# Patient Record
Sex: FEMALE | Race: OTHER | HISPANIC OR LATINO | URBAN - METROPOLITAN AREA
[De-identification: names, ages, dates, MRNs, and addresses within clinical notes are randomized per-mention and may not be internally consistent; named-entity substitution may affect disease eponyms.]

---

## 2022-01-28 ENCOUNTER — OUTPATIENT (OUTPATIENT)
Dept: OUTPATIENT SERVICES | Facility: HOSPITAL | Age: 32
LOS: 1 days | End: 2022-01-28
Payer: COMMERCIAL

## 2022-01-28 VITALS
TEMPERATURE: 98 F | RESPIRATION RATE: 18 BRPM | SYSTOLIC BLOOD PRESSURE: 101 MMHG | HEIGHT: 62 IN | HEART RATE: 63 BPM | OXYGEN SATURATION: 98 % | WEIGHT: 179.9 LBS | DIASTOLIC BLOOD PRESSURE: 70 MMHG

## 2022-01-28 DIAGNOSIS — O08.9 UNSPECIFIED COMPLICATION FOLLOWING AN ECTOPIC AND MOLAR PREGNANCY: ICD-10-CM

## 2022-01-28 LAB
ANION GAP SERPL CALC-SCNC: 11 MMOL/L — SIGNIFICANT CHANGE UP (ref 5–17)
BLD GP AB SCN SERPL QL: NEGATIVE — SIGNIFICANT CHANGE UP
BUN SERPL-MCNC: 15 MG/DL — SIGNIFICANT CHANGE UP (ref 7–23)
CALCIUM SERPL-MCNC: 9.8 MG/DL — SIGNIFICANT CHANGE UP (ref 8.4–10.5)
CHLORIDE SERPL-SCNC: 101 MMOL/L — SIGNIFICANT CHANGE UP (ref 96–108)
CO2 SERPL-SCNC: 24 MMOL/L — SIGNIFICANT CHANGE UP (ref 22–31)
CREAT SERPL-MCNC: 0.72 MG/DL — SIGNIFICANT CHANGE UP (ref 0.5–1.3)
GLUCOSE SERPL-MCNC: 96 MG/DL — SIGNIFICANT CHANGE UP (ref 70–99)
HCG SERPL-ACNC: 3.3 MIU/ML — SIGNIFICANT CHANGE UP
HCT VFR BLD CALC: 39.3 % — SIGNIFICANT CHANGE UP (ref 34.5–45)
HGB BLD-MCNC: 12.6 G/DL — SIGNIFICANT CHANGE UP (ref 11.5–15.5)
MCHC RBC-ENTMCNC: 27.8 PG — SIGNIFICANT CHANGE UP (ref 27–34)
MCHC RBC-ENTMCNC: 32.1 GM/DL — SIGNIFICANT CHANGE UP (ref 32–36)
MCV RBC AUTO: 86.6 FL — SIGNIFICANT CHANGE UP (ref 80–100)
NRBC # BLD: 0 /100 WBCS — SIGNIFICANT CHANGE UP (ref 0–0)
PLATELET # BLD AUTO: 316 K/UL — SIGNIFICANT CHANGE UP (ref 150–400)
POTASSIUM SERPL-MCNC: 4.4 MMOL/L — SIGNIFICANT CHANGE UP (ref 3.5–5.3)
POTASSIUM SERPL-SCNC: 4.4 MMOL/L — SIGNIFICANT CHANGE UP (ref 3.5–5.3)
RBC # BLD: 4.54 M/UL — SIGNIFICANT CHANGE UP (ref 3.8–5.2)
RBC # FLD: 12.7 % — SIGNIFICANT CHANGE UP (ref 10.3–14.5)
RH IG SCN BLD-IMP: POSITIVE — SIGNIFICANT CHANGE UP
SODIUM SERPL-SCNC: 136 MMOL/L — SIGNIFICANT CHANGE UP (ref 135–145)
WBC # BLD: 6.54 K/UL — SIGNIFICANT CHANGE UP (ref 3.8–10.5)
WBC # FLD AUTO: 6.54 K/UL — SIGNIFICANT CHANGE UP (ref 3.8–10.5)

## 2022-01-28 PROCEDURE — 86901 BLOOD TYPING SEROLOGIC RH(D): CPT

## 2022-01-28 PROCEDURE — U0003: CPT

## 2022-01-28 PROCEDURE — U0005: CPT

## 2022-01-28 PROCEDURE — 80048 BASIC METABOLIC PNL TOTAL CA: CPT

## 2022-01-28 PROCEDURE — 36415 COLL VENOUS BLD VENIPUNCTURE: CPT

## 2022-01-28 PROCEDURE — 84702 CHORIONIC GONADOTROPIN TEST: CPT

## 2022-01-28 PROCEDURE — 86850 RBC ANTIBODY SCREEN: CPT

## 2022-01-28 PROCEDURE — G0463: CPT

## 2022-01-28 PROCEDURE — 85027 COMPLETE CBC AUTOMATED: CPT

## 2022-01-28 PROCEDURE — 86900 BLOOD TYPING SEROLOGIC ABO: CPT

## 2022-01-28 RX ORDER — SODIUM CHLORIDE 9 MG/ML
3 INJECTION INTRAMUSCULAR; INTRAVENOUS; SUBCUTANEOUS EVERY 8 HOURS
Refills: 0 | Status: DISCONTINUED | OUTPATIENT
Start: 2022-02-01 | End: 2022-02-15

## 2022-01-28 NOTE — H&P PST ADULT - HISTORY OF PRESENT ILLNESS
This is a 32 y/o female was 7 weeks gestation in 2021, had elective  by taking "pills" in 2021 , still c/o vaginal bleeding , she presents today for dilation curettage for molar pregnancy 22  covid swab was done today at PST , denies recent travel, sick contact, covid symptoms

## 2022-01-28 NOTE — H&P PST ADULT - NSANTHOSAYNRD_GEN_A_CORE
No. KRISTA screening performed.  STOP BANG Legend: 0-2 = LOW Risk; 3-4 = INTERMEDIATE Risk; 5-8 = HIGH Risk

## 2022-01-28 NOTE — H&P PST ADULT - FALL HARM RISK - ATTEMPT OOB
No Pre-Excision Curettage Text (Leave Blank If You Do Not Want): Prior to drawing the surgical margin the visible lesion was removed with electrodesiccation and curettage to clearly define the lesion size.

## 2022-01-29 LAB — SARS-COV-2 RNA SPEC QL NAA+PROBE: SIGNIFICANT CHANGE UP

## 2022-02-01 ENCOUNTER — OUTPATIENT (OUTPATIENT)
Dept: OUTPATIENT SERVICES | Facility: HOSPITAL | Age: 32
LOS: 1 days | End: 2022-02-01
Payer: COMMERCIAL

## 2022-02-01 VITALS
SYSTOLIC BLOOD PRESSURE: 100 MMHG | WEIGHT: 179.9 LBS | OXYGEN SATURATION: 98 % | HEART RATE: 68 BPM | HEIGHT: 62 IN | TEMPERATURE: 98 F | RESPIRATION RATE: 16 BRPM | DIASTOLIC BLOOD PRESSURE: 65 MMHG

## 2022-02-01 VITALS
OXYGEN SATURATION: 100 % | RESPIRATION RATE: 16 BRPM | DIASTOLIC BLOOD PRESSURE: 60 MMHG | SYSTOLIC BLOOD PRESSURE: 99 MMHG | TEMPERATURE: 97 F | HEART RATE: 61 BPM

## 2022-02-01 DIAGNOSIS — O08.9 UNSPECIFIED COMPLICATION FOLLOWING AN ECTOPIC AND MOLAR PREGNANCY: ICD-10-CM

## 2022-02-01 PROCEDURE — 86900 BLOOD TYPING SEROLOGIC ABO: CPT

## 2022-02-01 PROCEDURE — 59812 TREATMENT OF MISCARRIAGE: CPT

## 2022-02-01 PROCEDURE — 88305 TISSUE EXAM BY PATHOLOGIST: CPT

## 2022-02-01 PROCEDURE — 86901 BLOOD TYPING SEROLOGIC RH(D): CPT

## 2022-02-01 PROCEDURE — 86850 RBC ANTIBODY SCREEN: CPT

## 2022-02-01 PROCEDURE — 88305 TISSUE EXAM BY PATHOLOGIST: CPT | Mod: 26

## 2022-02-01 RX ORDER — ONDANSETRON 8 MG/1
4 TABLET, FILM COATED ORAL ONCE
Refills: 0 | Status: COMPLETED | OUTPATIENT
Start: 2022-02-01 | End: 2022-02-01

## 2022-02-01 RX ORDER — LIDOCAINE HCL 20 MG/ML
0.2 VIAL (ML) INJECTION ONCE
Refills: 0 | Status: COMPLETED | OUTPATIENT
Start: 2022-02-01 | End: 2022-02-01

## 2022-02-01 RX ORDER — OXYCODONE HYDROCHLORIDE 5 MG/1
5 TABLET ORAL ONCE
Refills: 0 | Status: DISCONTINUED | OUTPATIENT
Start: 2022-02-01 | End: 2022-02-01

## 2022-02-01 RX ADMIN — ONDANSETRON 4 MILLIGRAM(S): 8 TABLET, FILM COATED ORAL at 14:17

## 2022-02-01 RX ADMIN — SODIUM CHLORIDE 3 MILLILITER(S): 9 INJECTION INTRAMUSCULAR; INTRAVENOUS; SUBCUTANEOUS at 10:29

## 2022-02-01 RX ADMIN — OXYCODONE HYDROCHLORIDE 5 MILLIGRAM(S): 5 TABLET ORAL at 14:16

## 2022-02-01 NOTE — ASU DISCHARGE PLAN (ADULT/PEDIATRIC) - CARE PROVIDER_API CALL
Mayra Fung)  Obstetrics and Gynecology  [address] [place] [id number]  Phone: [phone]  Fax: [phone]  Follow Up Time: 1 week

## 2022-02-01 NOTE — BRIEF OPERATIVE NOTE - NSICDXBRIEFPROCEDURE_GEN_ALL_CORE_FT
PROCEDURES:  Dilation and curettage, uterus, using suction, with US guidance 01-Feb-2022 13:41:51  Mayra Fung

## 2022-02-01 NOTE — ASU PATIENT PROFILE, ADULT - FALL HARM RISK - HARM RISK INTERVENTIONS

## 2022-02-01 NOTE — PRE-ANESTHESIA EVALUATION ADULT - NSANTHGENDERRD_ENT_A_CORE
[facility] Emergency Department  [address] [place] [address]  Phone:  [phone]  Fax:  [phone]                                    Bradley Aasen   MRN: [id number]    Department:  [facility] Emergency Department   Date of Visit:  12/6/2019           After Visit Summary Signature Page    I have received my discharge instructions, and my questions have been answered. I have discussed any challenges I see with this plan with the nurse or doctor.    ..........................................................................................................................................  Patient/Patient Representative Signature      ..........................................................................................................................................  Patient Representative Print Name and Relationship to Patient    ..................................................               ................................................  Date                                   Time    ..........................................................................................................................................  Reviewed by Signature/Title    ...................................................              ..............................................  Date                                               Time          22EPIC Rev 08/18        no apparent No

## 2022-02-01 NOTE — ASU DISCHARGE PLAN (ADULT/PEDIATRIC) - NS MD DC FALL RISK RISK
For information on Fall & Injury Prevention, visit: [email or web address] OR  [email or web address] OR  https://www.cdc.gov/steadi/patient.html

## 2022-02-01 NOTE — PRE-ANESTHESIA EVALUATION ADULT - NSANTHPMHFT_GEN_ALL_CORE
This is a 32 y/o female was 7 weeks gestation in 2021, had elective  by taking "pills" in 2021 , still c/o vaginal bleeding , she presents today for dilation curettage for molar pregnancy 22

## 2022-02-02 LAB — CHROM ANALY OVERALL INTERP SPEC-IMP: SIGNIFICANT CHANGE UP

## 2022-02-09 LAB — SURGICAL PATHOLOGY STUDY: SIGNIFICANT CHANGE UP

## 2022-04-30 NOTE — H&P PST ADULT - HEMATOLOGY/LYMPHATICS
Patient:   David Wallace             MRN: [id number]            FIN: [id number]               Age:   57 years     Sex:  Female     :  1961   Associated Diagnoses:   Chest pain   Author:   Garfield Mueller      Basic Information   Time seen: Date & time 2019 12:18:00.   History source: Patient.   Arrival mode: Private vehicle, walking.   History limitation: None.   Additional information: Patient's physician(s): Dr Copeland in [place], Chief Complaint from Nursing Triage Note : Chief Complaint   2019 12:18 CST      Chief Complaint           c/o intermittent  L sided CP that radiates down L arm and back x1 week. also reports L sided jaw pain and SOB x 1 week. denies cardiac hx. denies pain in triage.    .      History of Present Illness   The patient presents with chest pain and Patient states chest pain that radiates down her left sarah and intermittent SOB x one week (ambar, SUYAPA). .  The onset was 1  weeks ago.  The course/duration of symptoms is fluctuating in intensity.  Location: Left anterior superior chest. Radiating pain: left arm. The character of symptoms is tightness.  The degree at onset was moderate.  The degree at maximum was moderate.  The degree at present is none.  The exacerbating factor is none.  The relieving factor is none.  Risk factors consist of family history of coronary artery disease.  Prior episodes: none.  Therapy today None.  Associated symptoms: none.  Patient presents with chest pain radiating to left arm x 1 week. The pain is not exacerbated by activity. She does smoke a half a pack per day for greater than 40 years. Her father did have cardiovascular disease. She has had a hysterectomy which she is not on hormone replacement..        Review of Systems   Constitutional symptoms:  No fever, no chills.    Respiratory symptoms:  No shortness of breath, no cough.    Cardiovascular symptoms:  Chest pain, No palpitations,    Gastrointestinal symptoms:  No  vomiting, no diarrhea.    Musculoskeletal symptoms:  No back pain,    Neurologic symptoms:  No altered level of consciousness, no weakness.              Additional review of systems information: All other systems reviewed and otherwise negative.      Health Status   Allergies:    Allergic Reactions (Selected)  Severity Not Documented  Penicillins- No reactions were documented.,    Allergies (1) Active Reaction  penicillins None Documented.   Medications:  (Selected)   Documented Medications  Documented  KlonoPIN 0.5 mg oral tablet: 0.5 mg = 1 tab(s), Oral, Daily, 0 Refill(s)  Paxil 20 mg oral tablet: 10 mg = 0.5 tab(s), Oral, Daily, # 30 tab(s), 0 Refill(s), per nurse's notes.   Immunizations: Per nurse's notes.   Menstrual history: Per nurse's notes.      Past Medical/ Family/ Social History   Medical history:    No active or resolved past medical history items have been selected or recorded., Reviewed as documented in chart.   Surgical history:    Total abdominal hysterectomy (corpus and cervix), with or without removal of tube(s), with or without removal of ovary(s); (35171).  Appendectomy; (35893)., Reviewed as documented in chart.   Family history:    No family history items have been selected or recorded., Reviewed as documented in chart.   Social history: Reviewed as documented in chart.   Problem list: Per nurse's notes.      Physical Examination   General:  Alert, no acute distress, well hydrated, Skin: Normal for ethnicity.    Skin:  Warm, dry, pink, intact.    Head:  Normocephalic.   Neck:  Supple, no tenderness, full range of motion.    Eye:  Pupils are equal, round and reactive to light, extraocular movements are intact, normal conjunctiva.    Ears, nose, mouth and throat:  Oral mucosa moist.   Cardiovascular:  Regular rate and rhythm, No murmur, Normal peripheral perfusion.    Respiratory:  Lungs are clear to auscultation, breath sounds are equal, Respirations: not tachypneic, not labored, not shallow,  Retractions: None.    Chest wall:  No tenderness.   Back:  Normal range of motion, Normal alignment, No costovertebral angle tenderness,    Musculoskeletal:  Normal ROM, normal strength, no swelling, no deformity.    Gastrointestinal:  Soft, Nontender, Non distended, Normal bowel sounds.    Neurological:  Alert and oriented to person, place, time, and situation, No focal neurological deficit observed, normal sensory observed, normal motor observed, normal speech observed, normal coordination observed, Gait: Normal.    Psychiatric:  Cooperative, appropriate mood & affect, normal judgment.       Medical Decision Making   Documents reviewed:  Emergency department nurses' notes.   Orders  Launch Orders   Pharmacy:  aspirin 325 mg oral tablet (Order): 325 mg, form: Tab, Oral, Once, first dose 1/31/2019 15:08 CST, stop date 1/31/2019 15:08 CST, STAT, 4 chew tab = 5 grains  , Launch Orders   Laboratory:  POC iSTAT ER Troponin request (Order): Blood, Stat collect, 1/31/2019 15:52 CST by Demetria PONCE, Garfield Frausto, Lab Collect, Print Label By Order Location  Cardiology:  EKG (Order): 1/31/2019 15:52 CST, NOW, -1, -1, 1/31/2019 15:52 CST  .    Electrocardiogram:  Time 1/31/2019 12:18:00, rate 62, normal sinus rhythm, No ST-T changes, no ectopy, normal IL & QRS intervals, EP Interp.    Electrocardiogram:  Time 1/31/2019 15:55:00, rate 51, normal sinus rhythm, No ST-T changes, no ectopy, normal IL & QRS intervals, EP Interp.    Results review:  Lab results : Lab View   1/31/2019 16:09 CST      POC Troponin              0.00 ng/mL    1/31/2019 14:10 CST      POC BNP iSTAT             <15 pg/mL    1/31/2019 13:44 CST      POC BNP iSTAT             <15 pg/mL                             POC Troponin              0.00 ng/mL    1/31/2019 12:47 CST      Sodium Lvl                140 mmol/L                             Potassium Lvl             4.8 mmol/L                             Chloride                  104 mmol/L                              CO2                       31.0 mmol/L                             Calcium Lvl               9.6 mg/dL                             Glucose Lvl               93 mg/dL                             BUN                       13.0 mg/dL                             Creatinine                0.87 mg/dL                             eGFR-AA                   >60 mL/min/1.73 m2  NA                             eGFR-JADE                  >60 mL/min/1.73 m2  NA                             Bili Total                0.3 mg/dL                             Bili Direct               0.10 mg/dL                             Bili Indirect             0.20 mg/dL                             AST                       11 unit/L  LOW                             ALT                       15 unit/L                             Alk Phos                  131 unit/L  HI                             Total Protein             7.7 gm/dL                             Albumin Lvl               4.20 gm/dL                             Globulin                  3.50 gm/dL                             A/G Ratio                 1.2  NA                             Troponin-I                <0.02 ng/mL  LOW                             WBC                       7.6 x10(3)/mcL                             RBC                       4.36 x10(6)/mcL                             Hgb                       13.9 gm/dL                             Hct                       43.7 %                             Platelet                  320 x10(3)/mcL                             MCV                       100.2 fL  HI                             MCH                       31.9 pg  HI                             MCHC                      31.8 gm/dL  LOW                             RDW                       12.9 %                             MPV                       9.8 fL                             Abs Neut                  4.08 x10(3)/mcL                             Neutro Auto                53 %  NA                             Lymph Auto                38 %  NA                             Mono Auto                 6 %  NA                             Eos Auto                  1 %  NA                             Abs Eos                   0.1 x10(3)/mcL                             Basophil Auto             0 %  NA                             Abs Neutro                4.08 x10(3)/mcL                             Abs Lymph                 2.9 x10(3)/mcL                             Abs Mono                  0.5 x10(3)/mcL                             Abs Baso                  0.0 x10(3)/mcL    , Interpretation Labs unremarkable.    Radiology results:  Rad Results (ST)  < 12 hrs   Accession: [id number]  Order: XR Chest 2 Views  Report Dt/Tm: 01/31/2019 13:06  Report:   XR Chest 2 Views     REASON FOR STUDY: Chest Pain     TECHNIQUE: Frontal and lateral radiographs of the chest     COMPARISON: 11/1/2017     FINDINGS: Cardiac silhouette and mediastinal contours are within  normal limits. The lungs are well-inflated. No consolidation  identified. No definitive effusion is identified on the right. Likely  prior scarring given no significant interval change. Bibasilar  atelectatic changes.     IMPRESSION:      No significant interval change.      .      Impression and Plan   Diagnosis   Chest pain (IBN99-HV R07.9)      Calls-Consults   -  1/31/2019 17:13:00 , Keny RANDHAWA, Baudilio TAVERA, recommends Spoke to Eduardo accepts admit.    Plan   Condition: Stable.    Disposition: Admit time  1/31/2019 17:15:00, Place in Observation Telemetry Unit.    Counseled: Patient, Regarding diagnosis, Regarding diagnostic results, Regarding treatment plan, Patient indicated understanding of instructions.    Orders: Launch Orders   Admit/Transfer/Discharge:  Admit to Outpatient Observation (Order): 1/31/2019 17:34 CST, Baudilio Bustillo MD Remote Telemetry, No  .       Addendum      Teaching-Supervisory Addendum-Brief   I participated in  the following activities of this patients care: the medical history, the physical exam, medical decision making.   I personally performed: supervision of the patient's care, the medical history, the physical exam, the medical decision making.   The case was discussed with: the nurse practitioner.   Evaluation and management service: I agree with the evaluation and management decisions made in this patient's care.   Results interpretation: I agree with the study interpretation in this patient's care.   Notes: I conducted my own face-to-face evaluation of the patient and performed an independent history and physical examination of this patient. I discussed the MDM and reviewed the results with the NP.     Briefly, this is a 56 y/o F who presented for evaluation of intermittent CP and SOB x 1 weeks, symptoms progressively worsening in severity. Reports substernal pressure w/ radiation to her jaw and left arm. Denies associated N/V, diaphoresis. Unremarkable exam in ED, no rashes to chest, no tenderness to palpation. ECG w/ no ischemic changes. Initial cardiac enzymes negative and remainder of labs/CXR w/ no acute findings. Case was discussed with the cardiology service given the patient's typical anginal description of the pain, they agree to admission for serial cardiac enzymes overnight, monitoring to r/o ACS. Findings and plan discussed with the patient, and she is agreeable to admission at this time.     Julianna Cates MD  .    negative

## (undated) DEVICE — SOCK SPECIMEN 3/8"-1/2" MALE PORT

## (undated) DEVICE — TUBING UTERINE ASPIRATION 3/8" X 6FT W/O ADAPTER

## (undated) DEVICE — VACUUM CURETTE BERKLEY OLYMPUS CURVED 8MM

## (undated) DEVICE — WARMING BLANKET UPPER ADULT

## (undated) DEVICE — VACUUM CURETTE BERKLEY OLYMPUS CURVED 16MM X 1/2"

## (undated) DEVICE — PACK LITHOTOMY

## (undated) DEVICE — VACUUM CURETTE BUSSE HOSP CURVED 9MM

## (undated) DEVICE — VACUUM CURETTE BUSSE HOSP CURVED 12MM

## (undated) DEVICE — GLV 6.5 PROTEXIS (WHITE)

## (undated) DEVICE — VACUUM CURETTE BERKLEY OLYMPUS CURVED 11MM

## (undated) DEVICE — VACUUM CURETTE BUSSE HOSP STRAIGHT 7MM

## (undated) DEVICE — VACUUM CURETTE MEDGYN CURVED 13MM

## (undated) DEVICE — SOL IRR POUR NS 0.9% 500ML

## (undated) DEVICE — VACUUM CURETTE BUSSE HOSP CURVED 10MM

## (undated) DEVICE — VACUUM CURETTE BUSSE HOSP CURVED 14MM

## (undated) DEVICE — VACUUM CURETTE BERKLEY OLYMPUS F TIP 6MM

## (undated) DEVICE — VACUUM CURETTE BERKLEY OLYMPUS CURVED 9MM

## (undated) DEVICE — VACUUM CURETTE BERKLEY OLYMPUS CURVED 12MM

## (undated) DEVICE — VACUUM CURETTE BUSSE HOSP CURVED 11MM

## (undated) DEVICE — GOWN TRIMAX LG

## (undated) DEVICE — DRAPE 1/2 SHEET 40X57"

## (undated) DEVICE — DRAPE LIGHT HANDLE COVER (GREEN)

## (undated) DEVICE — VACUUM CURETTE BERKLEY OLYMPUS CURVED 7MM